# Patient Record
Sex: FEMALE | Race: WHITE | Employment: FULL TIME | URBAN - METROPOLITAN AREA
[De-identification: names, ages, dates, MRNs, and addresses within clinical notes are randomized per-mention and may not be internally consistent; named-entity substitution may affect disease eponyms.]

---

## 2017-01-11 ENCOUNTER — NURSE ONLY (OUTPATIENT)
Dept: FAMILY MEDICINE CLINIC | Facility: CLINIC | Age: 47
End: 2017-01-11

## 2017-01-11 DIAGNOSIS — Z11.1 SCREENING FOR TUBERCULOSIS: Primary | ICD-10-CM

## 2017-01-11 PROCEDURE — 86580 TB INTRADERMAL TEST: CPT | Performed by: FAMILY MEDICINE

## 2017-01-18 ENCOUNTER — NURSE ONLY (OUTPATIENT)
Dept: FAMILY MEDICINE CLINIC | Facility: CLINIC | Age: 47
End: 2017-01-18

## 2017-01-18 DIAGNOSIS — Z23 ENCOUNTER FOR IMMUNIZATION: Primary | ICD-10-CM

## 2017-01-18 LAB — INDURATION (): 0 MM (ref 0–11)

## 2017-01-18 PROCEDURE — 86580 TB INTRADERMAL TEST: CPT | Performed by: FAMILY MEDICINE

## 2017-01-20 ENCOUNTER — NURSE ONLY (OUTPATIENT)
Dept: FAMILY MEDICINE CLINIC | Facility: CLINIC | Age: 47
End: 2017-01-20

## 2017-01-20 LAB — INDURATION (): 0 MM (ref 0–11)

## 2017-05-12 ENCOUNTER — OFFICE VISIT (OUTPATIENT)
Dept: FAMILY MEDICINE CLINIC | Facility: CLINIC | Age: 47
End: 2017-05-12

## 2017-05-12 VITALS
DIASTOLIC BLOOD PRESSURE: 72 MMHG | HEART RATE: 80 BPM | BODY MASS INDEX: 41.93 KG/M2 | HEIGHT: 66.5 IN | WEIGHT: 264 LBS | SYSTOLIC BLOOD PRESSURE: 110 MMHG

## 2017-05-12 DIAGNOSIS — Z12.39 BREAST CANCER SCREENING: ICD-10-CM

## 2017-05-12 DIAGNOSIS — G25.81 RLS (RESTLESS LEGS SYNDROME): ICD-10-CM

## 2017-05-12 DIAGNOSIS — M79.7 FIBROMYALGIA: Primary | ICD-10-CM

## 2017-05-12 DIAGNOSIS — Z00.00 LABORATORY TESTS ORDERED AS PART OF A COMPLETE PHYSICAL EXAM (CPE): ICD-10-CM

## 2017-05-12 DIAGNOSIS — E66.01 MORBID OBESITY WITH BMI OF 45.0-49.9, ADULT (HCC): ICD-10-CM

## 2017-05-12 DIAGNOSIS — R73.02 GLUCOSE INTOLERANCE (IMPAIRED GLUCOSE TOLERANCE): ICD-10-CM

## 2017-05-12 DIAGNOSIS — F32.5 DEPRESSION, MAJOR, IN REMISSION (HCC): ICD-10-CM

## 2017-05-12 DIAGNOSIS — Z80.3 FH: BREAST CANCER: ICD-10-CM

## 2017-05-12 DIAGNOSIS — Z86.39 HISTORY OF VITAMIN B DEFICIENCY: ICD-10-CM

## 2017-05-12 DIAGNOSIS — Z86.39 HISTORY OF VITAMIN D DEFICIENCY: ICD-10-CM

## 2017-05-12 DIAGNOSIS — Z79.899 MEDICATION MANAGEMENT: ICD-10-CM

## 2017-05-12 PROCEDURE — 99214 OFFICE O/P EST MOD 30 MIN: CPT | Performed by: FAMILY MEDICINE

## 2017-05-12 RX ORDER — BUPROPION HYDROCHLORIDE 300 MG/1
300 TABLET ORAL DAILY
Qty: 90 TABLET | Refills: 1 | Status: SHIPPED | OUTPATIENT
Start: 2017-05-12 | End: 2017-09-08

## 2017-05-12 RX ORDER — GABAPENTIN 100 MG/1
CAPSULE ORAL NIGHTLY
Qty: 60 CAPSULE | Refills: 5 | Status: SHIPPED | OUTPATIENT
Start: 2017-05-12 | End: 2017-08-05

## 2017-05-12 RX ORDER — SENNOSIDES 8.6 MG
1300 CAPSULE ORAL 2 TIMES DAILY
COMMUNITY
End: 2018-11-13 | Stop reason: ALTCHOICE

## 2017-05-12 NOTE — PROGRESS NOTES
Carol Webb is a 40year old female. HPI:   Patient presents with:  Fibromyalgia: Rm. 9: Mirapex & Wellbutrin    #1 Fibromyalgia   was rough due to change in job more stress at job. Average is 6 on pain scale. Getting promotion.     Sleep is daily as needed. Disp: 1 Inhaler Rfl: 11   ValACYclovir HCl 500 MG Oral Tab Take 1 tablet by mouth daily. Disp:  Rfl: 1   Naproxen Sodium (ALEVE OR) Take 3 tablets by mouth 2 (two) times daily.    Disp:  Rfl:       Past Medical History   Diagnosis Date bowel sounds ×4 quadrant, no hepatosplenomegaly or masses, and no tenderness   EXTREMITIES: no cyanosis, clubbing or edema  Musculoskeletal: No gross deficit does have trigger point tenderness to the upper chest and back  Neurological: nerves II through XI Hr; Take 1 tablet (300 mg total) by mouth daily. Dispense: 90 tablet; Refill: 1    4. Breast cancer screening   FH: breast cancer  - mmm RISHI SAVAGE; Future    6.  Morbid obesity with BMI of 45.0-49.9, adult (HCC)  - BuPROPion HCl ER, XL, 300 MG Oral Ta

## 2017-05-14 PROBLEM — F32.5 DEPRESSION, MAJOR, IN REMISSION (HCC): Status: ACTIVE | Noted: 2017-05-14

## 2017-08-05 DIAGNOSIS — G25.81 RLS (RESTLESS LEGS SYNDROME): ICD-10-CM

## 2017-08-05 DIAGNOSIS — M79.7 FIBROMYALGIA: ICD-10-CM

## 2017-08-05 RX ORDER — GABAPENTIN 100 MG/1
CAPSULE ORAL
Qty: 270 CAPSULE | Refills: 0 | Status: SHIPPED | OUTPATIENT
Start: 2017-08-05 | End: 2017-09-08

## 2017-08-07 ENCOUNTER — MED REC SCAN ONLY (OUTPATIENT)
Dept: FAMILY MEDICINE CLINIC | Facility: CLINIC | Age: 47
End: 2017-08-07

## 2017-08-07 PROBLEM — H40.9 GLAUCOMA: Status: ACTIVE | Noted: 2017-08-07

## 2017-09-08 ENCOUNTER — LAB ENCOUNTER (OUTPATIENT)
Dept: LAB | Age: 47
End: 2017-09-08
Attending: FAMILY MEDICINE
Payer: COMMERCIAL

## 2017-09-08 ENCOUNTER — OFFICE VISIT (OUTPATIENT)
Dept: FAMILY MEDICINE CLINIC | Facility: CLINIC | Age: 47
End: 2017-09-08

## 2017-09-08 VITALS
RESPIRATION RATE: 16 BRPM | WEIGHT: 263 LBS | HEART RATE: 75 BPM | DIASTOLIC BLOOD PRESSURE: 78 MMHG | BODY MASS INDEX: 41.77 KG/M2 | HEIGHT: 66.5 IN | SYSTOLIC BLOOD PRESSURE: 134 MMHG

## 2017-09-08 DIAGNOSIS — Z86.39 HISTORY OF VITAMIN D DEFICIENCY: ICD-10-CM

## 2017-09-08 DIAGNOSIS — Z00.00 LABORATORY TESTS ORDERED AS PART OF A COMPLETE PHYSICAL EXAM (CPE): ICD-10-CM

## 2017-09-08 DIAGNOSIS — Z86.39 HISTORY OF VITAMIN B DEFICIENCY: ICD-10-CM

## 2017-09-08 DIAGNOSIS — G25.81 RLS (RESTLESS LEGS SYNDROME): ICD-10-CM

## 2017-09-08 DIAGNOSIS — Z79.899 MEDICATION MANAGEMENT: ICD-10-CM

## 2017-09-08 DIAGNOSIS — M25.551 RIGHT HIP PAIN: ICD-10-CM

## 2017-09-08 DIAGNOSIS — E66.01 MORBID OBESITY WITH BMI OF 45.0-49.9, ADULT (HCC): ICD-10-CM

## 2017-09-08 DIAGNOSIS — M79.7 FIBROMYALGIA: ICD-10-CM

## 2017-09-08 DIAGNOSIS — F32.5 DEPRESSION, MAJOR, IN REMISSION (HCC): ICD-10-CM

## 2017-09-08 DIAGNOSIS — R73.02 GLUCOSE INTOLERANCE (IMPAIRED GLUCOSE TOLERANCE): ICD-10-CM

## 2017-09-08 DIAGNOSIS — Z00.00 WELL FEMALE EXAM WITHOUT GYNECOLOGICAL EXAM: Primary | ICD-10-CM

## 2017-09-08 LAB
25-HYDROXYVITAMIN D (TOTAL): 49.1 NG/ML (ref 30–100)
ALBUMIN SERPL-MCNC: 4.4 G/DL (ref 3.5–4.8)
ALP LIVER SERPL-CCNC: 65 U/L (ref 37–98)
ALT SERPL-CCNC: 29 U/L (ref 14–54)
AST SERPL-CCNC: 12 U/L (ref 15–41)
BASOPHILS # BLD AUTO: 0.04 X10(3) UL (ref 0–0.1)
BASOPHILS NFR BLD AUTO: 0.8 %
BILIRUB SERPL-MCNC: 0.8 MG/DL (ref 0.1–2)
BUN BLD-MCNC: 22 MG/DL (ref 8–20)
CALCIUM BLD-MCNC: 9.4 MG/DL (ref 8.3–10.3)
CHLORIDE: 105 MMOL/L (ref 101–111)
CHOLEST SMN-MCNC: 172 MG/DL (ref ?–200)
CO2: 30 MMOL/L (ref 22–32)
CREAT BLD-MCNC: 1.14 MG/DL (ref 0.55–1.02)
EOSINOPHIL # BLD AUTO: 0.1 X10(3) UL (ref 0–0.3)
EOSINOPHIL NFR BLD AUTO: 1.9 %
ERYTHROCYTE [DISTWIDTH] IN BLOOD BY AUTOMATED COUNT: 12.4 % (ref 11.5–16)
EST. AVERAGE GLUCOSE BLD GHB EST-MCNC: 114 MG/DL (ref 68–126)
FREE T4: 1.2 NG/DL (ref 0.9–1.8)
GLUCOSE BLD-MCNC: 99 MG/DL (ref 70–99)
HAV AB SERPL IA-ACNC: 360 PG/ML (ref 193–986)
HBA1C MFR BLD HPLC: 5.6 % (ref ?–5.7)
HCT VFR BLD AUTO: 41.7 % (ref 34–50)
HDLC SERPL-MCNC: 45 MG/DL (ref 45–?)
HDLC SERPL: 3.82 {RATIO} (ref ?–4.44)
HGB BLD-MCNC: 13.3 G/DL (ref 12–16)
IMMATURE GRANULOCYTE COUNT: 0.02 X10(3) UL (ref 0–1)
IMMATURE GRANULOCYTE RATIO %: 0.4 %
LDLC SERPL CALC-MCNC: 94 MG/DL (ref ?–130)
LDLC SERPL-MCNC: 33 MG/DL (ref 5–40)
LYMPHOCYTES # BLD AUTO: 1.63 X10(3) UL (ref 0.9–4)
LYMPHOCYTES NFR BLD AUTO: 31.3 %
M PROTEIN MFR SERPL ELPH: 7.7 G/DL (ref 6.1–8.3)
MCH RBC QN AUTO: 30.2 PG (ref 27–33.2)
MCHC RBC AUTO-ENTMCNC: 31.9 G/DL (ref 31–37)
MCV RBC AUTO: 94.8 FL (ref 81–100)
MONOCYTES # BLD AUTO: 0.26 X10(3) UL (ref 0.1–0.6)
MONOCYTES NFR BLD AUTO: 5 %
NEUTROPHIL ABS PRELIM: 3.16 X10 (3) UL (ref 1.3–6.7)
NEUTROPHILS # BLD AUTO: 3.16 X10(3) UL (ref 1.3–6.7)
NEUTROPHILS NFR BLD AUTO: 60.6 %
NONHDLC SERPL-MCNC: 127 MG/DL (ref ?–130)
PLATELET # BLD AUTO: 225 10(3)UL (ref 150–450)
POTASSIUM SERPL-SCNC: 4.3 MMOL/L (ref 3.6–5.1)
RBC # BLD AUTO: 4.4 X10(6)UL (ref 3.8–5.1)
RED CELL DISTRIBUTION WIDTH-SD: 43.1 FL (ref 35.1–46.3)
SODIUM SERPL-SCNC: 141 MMOL/L (ref 136–144)
TRIGLYCERIDES: 163 MG/DL (ref ?–150)
TSI SER-ACNC: 1.14 MIU/ML (ref 0.35–5.5)
WBC # BLD AUTO: 5.2 X10(3) UL (ref 4–13)

## 2017-09-08 PROCEDURE — 82607 VITAMIN B-12: CPT | Performed by: FAMILY MEDICINE

## 2017-09-08 PROCEDURE — 83036 HEMOGLOBIN GLYCOSYLATED A1C: CPT | Performed by: FAMILY MEDICINE

## 2017-09-08 PROCEDURE — 82306 VITAMIN D 25 HYDROXY: CPT | Performed by: FAMILY MEDICINE

## 2017-09-08 PROCEDURE — 36415 COLL VENOUS BLD VENIPUNCTURE: CPT | Performed by: FAMILY MEDICINE

## 2017-09-08 PROCEDURE — 80061 LIPID PANEL: CPT | Performed by: FAMILY MEDICINE

## 2017-09-08 PROCEDURE — 99213 OFFICE O/P EST LOW 20 MIN: CPT | Performed by: FAMILY MEDICINE

## 2017-09-08 PROCEDURE — 80050 GENERAL HEALTH PANEL: CPT | Performed by: FAMILY MEDICINE

## 2017-09-08 PROCEDURE — 99396 PREV VISIT EST AGE 40-64: CPT | Performed by: FAMILY MEDICINE

## 2017-09-08 PROCEDURE — 84439 ASSAY OF FREE THYROXINE: CPT | Performed by: FAMILY MEDICINE

## 2017-09-08 RX ORDER — ALBUTEROL SULFATE 90 UG/1
2 AEROSOL, METERED RESPIRATORY (INHALATION) EVERY 6 HOURS PRN
Qty: 1 INHALER | Refills: 0 | Status: SHIPPED | OUTPATIENT
Start: 2017-09-08 | End: 2018-05-15

## 2017-09-08 RX ORDER — BUPROPION HYDROCHLORIDE 300 MG/1
300 TABLET ORAL DAILY
Qty: 90 TABLET | Refills: 1 | Status: SHIPPED | OUTPATIENT
Start: 2017-09-08 | End: 2018-04-11

## 2017-09-08 RX ORDER — GABAPENTIN 100 MG/1
CAPSULE ORAL
Qty: 270 CAPSULE | Refills: 1 | Status: SHIPPED | OUTPATIENT
Start: 2017-09-08 | End: 2018-05-15

## 2017-09-08 NOTE — PROGRESS NOTES
HPI:   Milka Rizvi is a 40year old female who presents for a complete physical exam. Symptoms: denies discharge, itching, burning or dysuria, every 3 months.      Abnormal pap last abnormal 11 years ago biopsy HPV, cryo procedure she thinks, last daily. Disp:  Rfl:    Cholecalciferol (VITAMIN D3) 90053 UNITS Oral Cap 1 capsule 3 times weekly  Disp:  Rfl:    Fluticasone Propionate 50 MCG/ACT Nasal Suspension 2 sprays by Nasal route daily as needed.  Disp: 1 Inhaler Rfl: 11   ValACYclovir HCl 500 MG O closely     REVIEW OF SYSTEMS:   GENERAL: denies fevers, weakness, trouble sleeping or weight changes  SKIN: denies any unusual skin lesions or rashes  EYES:denies vision changes  HEENT: denies upper respiratory symptoms  LUNGS: denies cough or shortness o physical exam.    Well female exam without gynecological exam  (primary encounter diagnosis)  Right hip pain  Fibromyalgia  Depression, major, in remission (hcc)  Rls (restless legs syndrome)  Morbid obesity with bmi of 45.0-49.9, adult ([facility])  Medication m exercise daily for 30-40 minutes. Patient is to have small frequent meals which would include protein and low carbohydrates. Increase dietary fruits, vegetables, foods high in fiber and nuts. Reviewed weight loss plan. Advised adequate sleep.  Patient is t

## 2017-09-09 ENCOUNTER — PATIENT MESSAGE (OUTPATIENT)
Dept: FAMILY MEDICINE CLINIC | Facility: CLINIC | Age: 47
End: 2017-09-09

## 2017-09-09 NOTE — PROGRESS NOTES
Hemoglobin A1c is much better 5.6  Try to reduce nonsteroidals are avoided altogether the kidney function has gone down since last check 1 year ago.   Try acetaminophen versus nonsteroidal.  Liidocaine patches or other alternative pain relievers besides non

## 2017-09-11 ENCOUNTER — TELEPHONE (OUTPATIENT)
Dept: FAMILY MEDICINE CLINIC | Facility: CLINIC | Age: 47
End: 2017-09-11

## 2017-09-11 DIAGNOSIS — Z79.899 MEDICATION MANAGEMENT: Primary | ICD-10-CM

## 2017-09-11 NOTE — TELEPHONE ENCOUNTER
----- Message from Jamel Reyes PA-C sent at 9/8/2017  8:54 PM CDT -----  Hemoglobin A1c is much better 5.6  Try to reduce nonsteroidals are avoided altogether the kidney function has gone down since last check 1 year ago.   Try acetaminophen versus no

## 2017-09-12 NOTE — TELEPHONE ENCOUNTER
----- Message from Jacqueline Salinas PA-C sent at 9/11/2017  4:19 PM CDT -----  Please forward her labs to her eye doctor see my chart message.   thanks

## 2017-09-13 ENCOUNTER — TELEPHONE (OUTPATIENT)
Dept: FAMILY MEDICINE CLINIC | Facility: CLINIC | Age: 47
End: 2017-09-13

## 2017-09-13 NOTE — TELEPHONE ENCOUNTER
Discussed test results with her ophthalmologist Dr. Pierre Higgins.  Ophthalmologist was told that she is going to stop nonsteroidals and repeat kidney function in 1 month.   Most likely kidney function decrease is secondary to her using more nonsteroidals recently

## 2017-10-05 DIAGNOSIS — F32.5 DEPRESSION, MAJOR, IN REMISSION (HCC): ICD-10-CM

## 2017-10-05 DIAGNOSIS — E66.01 MORBID OBESITY WITH BMI OF 45.0-49.9, ADULT (HCC): ICD-10-CM

## 2017-10-05 DIAGNOSIS — M79.7 FIBROMYALGIA: ICD-10-CM

## 2017-10-05 RX ORDER — BUPROPION HYDROCHLORIDE 300 MG/1
TABLET ORAL
Qty: 90 TABLET | Refills: 0 | OUTPATIENT
Start: 2017-10-05

## 2017-10-13 ENCOUNTER — HOSPITAL ENCOUNTER (OUTPATIENT)
Dept: MAMMOGRAPHY | Facility: HOSPITAL | Age: 47
Discharge: HOME | End: 2017-10-13
Attending: FAMILY MEDICINE
Payer: COMMERCIAL

## 2017-10-13 ENCOUNTER — HOSPITAL ENCOUNTER (OUTPATIENT)
Dept: GENERAL RADIOLOGY | Facility: HOSPITAL | Age: 47
Discharge: HOME | End: 2017-10-13
Attending: FAMILY MEDICINE
Payer: COMMERCIAL

## 2017-10-13 ENCOUNTER — LAB ENCOUNTER (OUTPATIENT)
Dept: LAB | Facility: HOSPITAL | Age: 47
End: 2017-10-13
Attending: FAMILY MEDICINE
Payer: COMMERCIAL

## 2017-10-13 DIAGNOSIS — Z12.39 BREAST CANCER SCREENING: ICD-10-CM

## 2017-10-13 DIAGNOSIS — M25.551 RIGHT HIP PAIN: ICD-10-CM

## 2017-10-13 DIAGNOSIS — Z80.3 FH: BREAST CANCER: ICD-10-CM

## 2017-10-13 DIAGNOSIS — Z79.899 MEDICATION MANAGEMENT: ICD-10-CM

## 2017-10-13 PROCEDURE — 77067 SCR MAMMO BI INCL CAD: CPT | Performed by: FAMILY MEDICINE

## 2017-10-13 PROCEDURE — 36415 COLL VENOUS BLD VENIPUNCTURE: CPT

## 2017-10-13 PROCEDURE — 73502 X-RAY EXAM HIP UNI 2-3 VIEWS: CPT | Performed by: FAMILY MEDICINE

## 2017-10-13 PROCEDURE — 80048 BASIC METABOLIC PNL TOTAL CA: CPT

## 2017-10-13 NOTE — PROGRESS NOTES
Severe right hip osteoarthritis/narrowing of the right hip joint refer to Dr. Va Lorenzo orthopedic for evaluation. Call patient with Dr. Ace Joseph information sent to my chart.

## 2017-10-13 NOTE — PROGRESS NOTES
GFR is down some more refer patient to nephrologist.  Call patient and notify her of nephrologist either Dr. Aletha Young or Dr. Sherren Bellis   sent to my chart

## 2017-10-17 ENCOUNTER — TELEPHONE (OUTPATIENT)
Dept: FAMILY MEDICINE CLINIC | Facility: CLINIC | Age: 47
End: 2017-10-17

## 2017-10-17 DIAGNOSIS — M16.11 OSTEOARTHRITIS OF RIGHT HIP, UNSPECIFIED OSTEOARTHRITIS TYPE: Primary | ICD-10-CM

## 2017-10-17 DIAGNOSIS — R94.4 DECREASED GFR: ICD-10-CM

## 2017-10-17 NOTE — TELEPHONE ENCOUNTER
Fabiana Mtz PA-C  [facility] Staff             GFR is down some more refer patient to nephrologist.   Call patient and notify her of nephrologist either Dr. Ines Mondragon or Dr. Mario Alberto Horan   sent to my chart

## 2017-10-17 NOTE — TELEPHONE ENCOUNTER
----- Message from Erin David PA-C sent at 10/13/2017  4:41 PM CDT -----  Severe right hip osteoarthritis/narrowing of the right hip joint refer to Dr. Joaquim Ochoa orthopedic for evaluation. Call patient with Dr. Edilberto Botello information sent to my chart.

## 2017-10-17 NOTE — TELEPHONE ENCOUNTER
Pt informed of test results via my chart. Lm on private voicemail, per signed consent, with Dr. Neris Young contact information.

## 2017-12-14 PROBLEM — M16.11 PRIMARY OSTEOARTHRITIS OF RIGHT HIP: Status: ACTIVE | Noted: 2017-12-14

## 2018-02-06 ENCOUNTER — OFFICE VISIT (OUTPATIENT)
Dept: FAMILY MEDICINE CLINIC | Facility: CLINIC | Age: 48
End: 2018-02-06

## 2018-02-06 VITALS
HEART RATE: 88 BPM | BODY MASS INDEX: 40.82 KG/M2 | HEIGHT: 66.22 IN | WEIGHT: 254 LBS | DIASTOLIC BLOOD PRESSURE: 78 MMHG | SYSTOLIC BLOOD PRESSURE: 118 MMHG | TEMPERATURE: 98 F

## 2018-02-06 DIAGNOSIS — M79.89 MASS OF SOFT TISSUE OF RIGHT UPPER EXTREMITY: Primary | ICD-10-CM

## 2018-02-06 PROCEDURE — 99213 OFFICE O/P EST LOW 20 MIN: CPT | Performed by: FAMILY MEDICINE

## 2018-02-06 NOTE — PROGRESS NOTES
Pierce Epley is a 56year old female. HPI:   Patient has been walking with a limp secondary to needing hip surgery. Is getting right hip replacement with [facility] Dr. Zak White on 6/13/18.   Patient noticed a lump on the right side of the without intrauterine pregnancy 1997   • Uveitis     left   • Vitamin D deficiency    • Wheezing 10/14/10      Social History:  Smoking status: Former Smoker                                                              Packs/day: 0.25      Years: 0.00 (primary encounter diagnosis)    No orders of the defined types were placed in this encounter. Meds & Refills for this Visit:  No prescriptions requested or ordered in this encounter    Imaging & Consults:  SURGERY - INTERNAL  1.  Mass of soft tissue

## 2018-02-06 NOTE — PATIENT INSTRUCTIONS
SCHEDULING [facility] LAB APPOINTMENTS ONLINE    Lab appointments can now be scheduled online at [email or web address]    · Go to [email or web address]  · In Search type Lab  · Click \"Lab services\"  · Click \"Schedule Your Test Online\"  · Follow the prompts  · If you But if the lipoma is painful or you want it removed for cosmetic reasons, it can be removed with surgery. The surgery is called excision. The lipoma will most likely not grow back after surgery. During surgery, the area around the lipoma is numbed.  If you

## 2018-04-11 DIAGNOSIS — E66.01 MORBID OBESITY WITH BMI OF 45.0-49.9, ADULT (HCC): ICD-10-CM

## 2018-04-11 DIAGNOSIS — M79.7 FIBROMYALGIA: ICD-10-CM

## 2018-04-11 DIAGNOSIS — F32.5 DEPRESSION, MAJOR, IN REMISSION (HCC): ICD-10-CM

## 2018-04-11 RX ORDER — BUPROPION HYDROCHLORIDE 300 MG/1
TABLET ORAL
Qty: 90 TABLET | Refills: 0 | Status: SHIPPED | OUTPATIENT
Start: 2018-04-11 | End: 2018-05-15

## 2018-05-15 ENCOUNTER — OFFICE VISIT (OUTPATIENT)
Dept: FAMILY MEDICINE CLINIC | Facility: CLINIC | Age: 48
End: 2018-05-15

## 2018-05-15 ENCOUNTER — LAB ENCOUNTER (OUTPATIENT)
Dept: LAB | Age: 48
End: 2018-05-15
Attending: FAMILY MEDICINE
Payer: COMMERCIAL

## 2018-05-15 VITALS
HEIGHT: 66.22 IN | HEART RATE: 84 BPM | RESPIRATION RATE: 16 BRPM | BODY MASS INDEX: 40.63 KG/M2 | WEIGHT: 252.81 LBS | SYSTOLIC BLOOD PRESSURE: 128 MMHG | DIASTOLIC BLOOD PRESSURE: 72 MMHG

## 2018-05-15 DIAGNOSIS — L73.9 FOLLICULITIS: ICD-10-CM

## 2018-05-15 DIAGNOSIS — Z01.818 PREOP EXAMINATION: Primary | ICD-10-CM

## 2018-05-15 DIAGNOSIS — J45.40 MODERATE PERSISTENT EXTRINSIC ASTHMA WITHOUT COMPLICATION: ICD-10-CM

## 2018-05-15 DIAGNOSIS — F32.5 DEPRESSION, MAJOR, IN REMISSION (HCC): ICD-10-CM

## 2018-05-15 DIAGNOSIS — M79.7 FIBROMYALGIA: ICD-10-CM

## 2018-05-15 DIAGNOSIS — M16.11 PRIMARY OSTEOARTHRITIS OF RIGHT HIP: ICD-10-CM

## 2018-05-15 DIAGNOSIS — N18.30 CHRONIC RENAL INSUFFICIENCY, STAGE 3 (MODERATE) (HCC): ICD-10-CM

## 2018-05-15 DIAGNOSIS — G25.81 RLS (RESTLESS LEGS SYNDROME): ICD-10-CM

## 2018-05-15 DIAGNOSIS — E66.01 MORBID OBESITY WITH BMI OF 45.0-49.9, ADULT (HCC): ICD-10-CM

## 2018-05-15 DIAGNOSIS — Z01.818 PREOP EXAMINATION: ICD-10-CM

## 2018-05-15 PROCEDURE — 99214 OFFICE O/P EST MOD 30 MIN: CPT | Performed by: FAMILY MEDICINE

## 2018-05-15 PROCEDURE — 87147 CULTURE TYPE IMMUNOLOGIC: CPT | Performed by: FAMILY MEDICINE

## 2018-05-15 PROCEDURE — 87070 CULTURE OTHR SPECIMN AEROBIC: CPT | Performed by: FAMILY MEDICINE

## 2018-05-15 PROCEDURE — 85025 COMPLETE CBC W/AUTO DIFF WBC: CPT | Performed by: FAMILY MEDICINE

## 2018-05-15 PROCEDURE — 36415 COLL VENOUS BLD VENIPUNCTURE: CPT | Performed by: FAMILY MEDICINE

## 2018-05-15 PROCEDURE — 87205 SMEAR GRAM STAIN: CPT | Performed by: FAMILY MEDICINE

## 2018-05-15 PROCEDURE — 80053 COMPREHEN METABOLIC PANEL: CPT | Performed by: FAMILY MEDICINE

## 2018-05-15 PROCEDURE — 93000 ELECTROCARDIOGRAM COMPLETE: CPT | Performed by: FAMILY MEDICINE

## 2018-05-15 PROCEDURE — 87186 SC STD MICRODIL/AGAR DIL: CPT | Performed by: FAMILY MEDICINE

## 2018-05-15 PROCEDURE — 94010 BREATHING CAPACITY TEST: CPT | Performed by: FAMILY MEDICINE

## 2018-05-15 RX ORDER — BUDESONIDE AND FORMOTEROL FUMARATE DIHYDRATE 160; 4.5 UG/1; UG/1
2 AEROSOL RESPIRATORY (INHALATION) 2 TIMES DAILY
Qty: 1 INHALER | Refills: 5 | Status: SHIPPED | OUTPATIENT
Start: 2018-05-15

## 2018-05-15 RX ORDER — BUPROPION HYDROCHLORIDE 300 MG/1
TABLET ORAL
Qty: 90 TABLET | Refills: 1 | Status: SHIPPED | OUTPATIENT
Start: 2018-05-15 | End: 2018-10-10

## 2018-05-15 RX ORDER — ALBUTEROL SULFATE 90 UG/1
2 AEROSOL, METERED RESPIRATORY (INHALATION) EVERY 6 HOURS PRN
Qty: 1 INHALER | Refills: 0 | Status: SHIPPED | OUTPATIENT
Start: 2018-05-15 | End: 2018-11-13

## 2018-05-15 RX ORDER — GABAPENTIN 300 MG/1
300 CAPSULE ORAL 3 TIMES DAILY
Qty: 90 CAPSULE | Refills: 1 | Status: SHIPPED | OUTPATIENT
Start: 2018-05-15 | End: 2018-11-13

## 2018-05-16 ENCOUNTER — TELEPHONE (OUTPATIENT)
Dept: FAMILY MEDICINE CLINIC | Facility: CLINIC | Age: 48
End: 2018-05-16

## 2018-05-16 PROBLEM — J45.909 EXTRINSIC ASTHMA: Status: ACTIVE | Noted: 2018-05-16

## 2018-05-16 NOTE — TELEPHONE ENCOUNTER
----- Message from Kip Duran PA-C sent at 5/15/2018  9:04 PM CDT -----  CMP and CBC normal  Sent to my chart  Forward labs for surgery

## 2018-05-17 NOTE — TELEPHONE ENCOUNTER
----- Message from Ellen Castellanos PA-C sent at 5/17/2018  7:25 AM CDT -----  There is growth of staph aureus I am still waiting on the sensitivity will need to put patient on antibiotic before her  surgery.   Please let patient know we are still awaiting

## 2018-05-17 NOTE — TELEPHONE ENCOUNTER
A message was left on the patient's confidential voicemail notifying her that there was growth of staph aureus, but that Aleyda Simpson is still waiting on the sensitivity report that she will need in order to put her on an antibiotic before surgery.

## 2018-05-17 NOTE — PROGRESS NOTES
There is growth of staph aureus I am still waiting on the sensitivity will need to put patient on antibiotic before her  surgery. Please let patient know we are still awaiting sensitivity report.

## 2018-05-17 NOTE — PROGRESS NOTES
Growth staph aureus not methicillin-resistant start Bactrim DS (since you are allergic to penicillin) twice a day for 10 days.

## 2018-05-18 RX ORDER — SULFAMETHOXAZOLE AND TRIMETHOPRIM 800; 160 MG/1; MG/1
1 TABLET ORAL 2 TIMES DAILY
Qty: 20 TABLET | Refills: 0 | Status: SHIPPED | OUTPATIENT
Start: 2018-05-18 | End: 2018-05-28

## 2018-05-18 NOTE — TELEPHONE ENCOUNTER
A message was left on the patient's confidential voicemail notifying her that the growth staph aureus is not methicillin-resistant. She was directed to start Bactrim DS twice a day for 10 days. The patient was instructed to call back with any questions.

## 2018-05-18 NOTE — TELEPHONE ENCOUNTER
----- Message from Vannesa Mercedes PA-C sent at 5/17/2018  4:53 PM CDT -----  Growth staph aureus not methicillin-resistant start Bactrim DS (since you are allergic to penicillin) twice a day for 10 days.

## 2018-06-01 ENCOUNTER — TELEPHONE (OUTPATIENT)
Dept: FAMILY MEDICINE CLINIC | Facility: CLINIC | Age: 48
End: 2018-06-01

## 2018-06-01 NOTE — TELEPHONE ENCOUNTER
The patient's Pre-op H&P, EKG, Spirometry, & labs were successfully faxed again to Dr. Charlotte Nam at [phone] since his consult note from 05/31/2018 stated that they were still awaiting medical clearance.

## 2018-06-01 NOTE — TELEPHONE ENCOUNTER
On 05/18/2018, the patient's Pre-op information was successfully faxed to Dr. Ivana Lainez at [phone].

## 2018-06-19 ENCOUNTER — PATIENT OUTREACH (OUTPATIENT)
Dept: CASE MANAGEMENT | Age: 48
End: 2018-06-19

## 2018-07-03 NOTE — PROGRESS NOTES
Multiple attempts to reach pt and messages left with no call back. Past TCM timeframe. Encounter closing.

## 2018-10-10 DIAGNOSIS — M79.7 FIBROMYALGIA: ICD-10-CM

## 2018-10-10 DIAGNOSIS — E66.01 MORBID OBESITY WITH BMI OF 45.0-49.9, ADULT (HCC): ICD-10-CM

## 2018-10-10 DIAGNOSIS — F32.5 DEPRESSION, MAJOR, IN REMISSION (HCC): ICD-10-CM

## 2018-10-10 RX ORDER — BUPROPION HYDROCHLORIDE 300 MG/1
TABLET ORAL
Qty: 90 TABLET | Refills: 0 | Status: SHIPPED | OUTPATIENT
Start: 2018-10-10

## 2018-11-13 ENCOUNTER — OFFICE VISIT (OUTPATIENT)
Dept: FAMILY MEDICINE CLINIC | Facility: CLINIC | Age: 48
End: 2018-11-13
Payer: COMMERCIAL

## 2018-11-13 VITALS
SYSTOLIC BLOOD PRESSURE: 104 MMHG | BODY MASS INDEX: 39.86 KG/M2 | WEIGHT: 248 LBS | DIASTOLIC BLOOD PRESSURE: 68 MMHG | HEART RATE: 72 BPM | HEIGHT: 66.3 IN

## 2018-11-13 DIAGNOSIS — Z12.4 SCREENING FOR MALIGNANT NEOPLASM OF CERVIX: ICD-10-CM

## 2018-11-13 DIAGNOSIS — Z01.419 WELL FEMALE EXAM WITH ROUTINE GYNECOLOGICAL EXAM: Primary | ICD-10-CM

## 2018-11-13 DIAGNOSIS — Z79.899 MEDICATION MANAGEMENT: ICD-10-CM

## 2018-11-13 DIAGNOSIS — J45.20 MILD INTERMITTENT EXTRINSIC ASTHMA WITHOUT COMPLICATION: ICD-10-CM

## 2018-11-13 DIAGNOSIS — Z12.39 BREAST CANCER SCREENING: ICD-10-CM

## 2018-11-13 DIAGNOSIS — Z00.00 LABORATORY EXAMINATION ORDERED AS PART OF A ROUTINE GENERAL MEDICAL EXAMINATION: ICD-10-CM

## 2018-11-13 DIAGNOSIS — G25.81 RLS (RESTLESS LEGS SYNDROME): ICD-10-CM

## 2018-11-13 PROCEDURE — 88175 CYTOPATH C/V AUTO FLUID REDO: CPT | Performed by: FAMILY MEDICINE

## 2018-11-13 PROCEDURE — 99396 PREV VISIT EST AGE 40-64: CPT | Performed by: FAMILY MEDICINE

## 2018-11-13 PROCEDURE — 87624 HPV HI-RISK TYP POOLED RSLT: CPT | Performed by: FAMILY MEDICINE

## 2018-11-13 PROCEDURE — 99213 OFFICE O/P EST LOW 20 MIN: CPT | Performed by: FAMILY MEDICINE

## 2018-11-13 RX ORDER — ACETAMINOPHEN 500 MG
TABLET ORAL DAILY PRN
COMMUNITY

## 2018-11-13 RX ORDER — GABAPENTIN 300 MG/1
300 CAPSULE ORAL NIGHTLY
Qty: 90 CAPSULE | Refills: 1 | Status: SHIPPED | OUTPATIENT
Start: 2018-11-13 | End: 2019-03-19

## 2018-11-13 RX ORDER — ALBUTEROL SULFATE 90 UG/1
2 AEROSOL, METERED RESPIRATORY (INHALATION) EVERY 6 HOURS PRN
Qty: 1 INHALER | Refills: 0 | Status: SHIPPED | OUTPATIENT
Start: 2018-11-13

## 2018-11-13 NOTE — PROGRESS NOTES
HPI:   Tony Regalado is a 53year old female who presents for a complete physical exam.   Symptoms: perimenopausal occassional hot flashes.    Symptoms: denies discharge, itching, burning or dysuria, 2 periods in the past 2 months prior was every 6 24 Hr TAKE 1 TABLET(300 MG) BY MOUTH DAILY Disp: 90 tablet Rfl: 0   Cholecalciferol (VITAMIN D3) 59141 UNITS Oral Cap 1 capsule 3 times weekly  Disp:  Rfl:    Fluticasone Propionate 50 MCG/ACT Nasal Suspension 2 sprays by Nasal route daily as needed.  Disp: = 27 pack years    Alcohol use: Yes      Alcohol/week: 0.0 - 1.2 oz      Drinks per session: 1 or 2      Binge frequency: Never      Comment: social    Drug use: No    Occ: . : engaged. Children: . Exercise: walking.   Diet: watches becerra normal discharge and normal cervix. Bimanual exam normal no adnexal masses or cervical motion tenderness, PAP was done    MUSCULOSKELETAL: gait aramis,l no gross M/S defect.   EXTREMITIES: no clubbing, cyanosis, or edema  NEURO: oriented times three, crania asthma without complication  Albuterol inhaler as needed  4. Laboratory examination ordered as part of a routine general medical examination  - COMP METABOLIC PANEL (14); Future  - LIPID PANEL; Future  - TSH+FREE T4; Future    5.  Breast cancer screening  -

## 2018-11-14 PROBLEM — J45.909 EXTRINSIC ASTHMA: Status: RESOLVED | Noted: 2018-05-16 | Resolved: 2018-11-14

## 2018-12-14 ENCOUNTER — TELEPHONE (OUTPATIENT)
Dept: FAMILY MEDICINE CLINIC | Facility: CLINIC | Age: 48
End: 2018-12-14

## 2018-12-14 DIAGNOSIS — Z01.84 IMMUNITY STATUS TESTING: Primary | ICD-10-CM

## 2018-12-14 NOTE — TELEPHONE ENCOUNTER
Pt called stating she had a TB done at her work and came back questionable and her employer is requesting a TB lab done. Pt would like this ordered asap because she can not return to work until this is complete.  Pt was advised BODØ is not in office wou

## 2018-12-17 NOTE — TELEPHONE ENCOUNTER
Lab test already placed in system on Friday and once the results are in pt would like to be notified and also have a copy faxed to her office.   She will call our office back later with the fax number to her office

## 2018-12-21 NOTE — TELEPHONE ENCOUNTER
A message was left on the patient's confidential voicemail notifying her that her lab results were negative and that they were being faxed per her instructions. Per patient request, her results were successfully faxed to [phone], Attn: Omer Anaya.

## 2019-06-10 ENCOUNTER — TELEPHONE (OUTPATIENT)
Dept: FAMILY MEDICINE CLINIC | Facility: CLINIC | Age: 49
End: 2019-06-10

## 2019-06-10 NOTE — TELEPHONE ENCOUNTER
Received medical records request from [facility]. Request sent to [employer] via green bag.

## 2021-09-10 NOTE — H&P
Milka Rizvi is a 56year old female who presents for a pre-operative physical exam.   Right Total Hip Arthroplasty Anterior Approach 6/13/18: Right hip replacement with/Dr. Debbie Bill at [facility].      Prior surgery Ectopic pregnancy laparoscopic and c - 1.00 x10(3) uL 0.23   Eosinophils Absolute      0.00 - 0.30 x10(3) uL 0.05   Basophils Absolute      0.00 - 0.10 x10(3) uL 0.02   Immature Granulocyte Absolute      0.00 - 1.00 x10(3) uL 0.02   Neutrophils %      % 57.9   Lymphocytes %      % 35.7   Mono lungs 2 (two) times daily. Rinse mouth after use Disp: 1 Inhaler Rfl: 5   Acetaminophen ER (TYLENOL ARTHRITIS PAIN) 650 MG Oral Tab CR Take 1,300 mg by mouth 2 (two) times daily.  Disp:  Rfl:    Cholecalciferol (VITAMIN D3) 18142 UNITS Oral Cap 1 capsule 3 Currently uses an e-cigarette; 2 packs/day x            13 years + 1/2 a pack/day = 27 pack years  Alcohol use: Yes           0.0 - 1.2 oz/week     Standard drinks or equivalent: 0 - 2 per week     Comment: social     Occ: off work for 1 month.  : fi edema  NEURO: Oriented times three,cranial nerves are intact,motor and sensory are grossly intact    ASSESSMENT AND PLAN:   Ferdinand Huang is a 56year old female who presents for a pre-operative physical exam.   Preop examination  (primary encounte the referring physician, Dr. Nile Hudson at [facility]. .      - BREATHING CAPACITY TEST  - ELECTROCARDIOGRAM, COMPLETE  - COMP METABOLIC PANEL (14); Future  - CBC WITH DIFFERENTIAL WITH PLATELET; Future    2.  Primary osteoarthritis of right hip  No nonsteroidals f Cosentyx Counseling:  I discussed with the patient the risks of Cosentyx including but not limited to worsening of Crohn's disease, immunosuppression, allergic reactions and infections.  The patient understands that monitoring is required including a PPD at baseline and must alert us or the primary physician if symptoms of infection or other concerning signs are noted.

## 2024-07-18 ENCOUNTER — TELEPHONE (OUTPATIENT)
Dept: INFECTIOUS DISEASES | Facility: CLINIC | Age: 54
End: 2024-07-18

## 2024-08-20 ENCOUNTER — LAB (OUTPATIENT)
Dept: LAB | Facility: HOSPITAL | Age: 54
End: 2024-08-20
Payer: COMMERCIAL

## 2024-08-20 ENCOUNTER — OFFICE VISIT (OUTPATIENT)
Dept: INFECTIOUS DISEASES | Facility: CLINIC | Age: 54
End: 2024-08-20
Payer: COMMERCIAL

## 2024-08-20 VITALS
WEIGHT: 287 LBS | RESPIRATION RATE: 20 BRPM | TEMPERATURE: 97.1 F | HEART RATE: 83 BPM | DIASTOLIC BLOOD PRESSURE: 82 MMHG | SYSTOLIC BLOOD PRESSURE: 139 MMHG

## 2024-08-20 DIAGNOSIS — M27.2 OSTEOMYELITIS OF MANDIBLE: Primary | ICD-10-CM

## 2024-08-20 DIAGNOSIS — K08.409 S/P TOOTH EXTRACTION: ICD-10-CM

## 2024-08-20 LAB
ANION GAP SERPL CALCULATED.3IONS-SCNC: 8.1 MMOL/L (ref 5–15)
BASOPHILS # BLD AUTO: 0.02 10*3/MM3 (ref 0–0.2)
BASOPHILS NFR BLD AUTO: 0.4 % (ref 0–1.5)
BUN SERPL-MCNC: 20 MG/DL (ref 6–20)
BUN/CREAT SERPL: 18.7 (ref 7–25)
CALCIUM SPEC-SCNC: 9.7 MG/DL (ref 8.6–10.5)
CHLORIDE SERPL-SCNC: 104 MMOL/L (ref 98–107)
CO2 SERPL-SCNC: 27.9 MMOL/L (ref 22–29)
CREAT SERPL-MCNC: 1.07 MG/DL (ref 0.57–1)
CRP SERPL-MCNC: 0.31 MG/DL (ref 0–0.5)
DEPRECATED RDW RBC AUTO: 42.4 FL (ref 37–54)
EGFRCR SERPLBLD CKD-EPI 2021: 62.6 ML/MIN/1.73
EOSINOPHIL # BLD AUTO: 0.09 10*3/MM3 (ref 0–0.4)
EOSINOPHIL NFR BLD AUTO: 1.7 % (ref 0.3–6.2)
ERYTHROCYTE [DISTWIDTH] IN BLOOD BY AUTOMATED COUNT: 12.6 % (ref 12.3–15.4)
GLUCOSE SERPL-MCNC: 101 MG/DL (ref 65–99)
HCT VFR BLD AUTO: 38.7 % (ref 34–46.6)
HGB BLD-MCNC: 12.8 G/DL (ref 12–15.9)
IMM GRANULOCYTES # BLD AUTO: 0.03 10*3/MM3 (ref 0–0.05)
IMM GRANULOCYTES NFR BLD AUTO: 0.6 % (ref 0–0.5)
LYMPHOCYTES # BLD AUTO: 1.58 10*3/MM3 (ref 0.7–3.1)
LYMPHOCYTES NFR BLD AUTO: 29.8 % (ref 19.6–45.3)
MCH RBC QN AUTO: 30.5 PG (ref 26.6–33)
MCHC RBC AUTO-ENTMCNC: 33.1 G/DL (ref 31.5–35.7)
MCV RBC AUTO: 92.4 FL (ref 79–97)
MONOCYTES # BLD AUTO: 0.29 10*3/MM3 (ref 0.1–0.9)
MONOCYTES NFR BLD AUTO: 5.5 % (ref 5–12)
NEUTROPHILS NFR BLD AUTO: 3.29 10*3/MM3 (ref 1.7–7)
NEUTROPHILS NFR BLD AUTO: 62 % (ref 42.7–76)
NRBC BLD AUTO-RTO: 0 /100 WBC (ref 0–0.2)
PLATELET # BLD AUTO: 211 10*3/MM3 (ref 140–450)
PMV BLD AUTO: 9.6 FL (ref 6–12)
POTASSIUM SERPL-SCNC: 4.8 MMOL/L (ref 3.5–5.2)
RBC # BLD AUTO: 4.19 10*6/MM3 (ref 3.77–5.28)
SODIUM SERPL-SCNC: 140 MMOL/L (ref 136–145)
WBC NRBC COR # BLD AUTO: 5.3 10*3/MM3 (ref 3.4–10.8)

## 2024-08-20 PROCEDURE — 99204 OFFICE O/P NEW MOD 45 MIN: CPT | Performed by: INTERNAL MEDICINE

## 2024-08-20 PROCEDURE — 85025 COMPLETE CBC W/AUTO DIFF WBC: CPT | Performed by: INTERNAL MEDICINE

## 2024-08-20 PROCEDURE — 86140 C-REACTIVE PROTEIN: CPT | Performed by: INTERNAL MEDICINE

## 2024-08-20 PROCEDURE — 80048 BASIC METABOLIC PNL TOTAL CA: CPT | Performed by: INTERNAL MEDICINE

## 2024-08-20 PROCEDURE — 36415 COLL VENOUS BLD VENIPUNCTURE: CPT | Performed by: INTERNAL MEDICINE

## 2024-08-20 RX ORDER — BUPROPION HYDROCHLORIDE 300 MG/1
300 TABLET ORAL
COMMUNITY
Start: 2023-11-13 | End: 2024-11-12

## 2024-08-20 RX ORDER — ALBUTEROL SULFATE 90 UG/1
AEROSOL, METERED RESPIRATORY (INHALATION)
COMMUNITY
Start: 2024-07-12

## 2024-08-20 RX ORDER — ATORVASTATIN CALCIUM 20 MG/1
TABLET, FILM COATED ORAL
COMMUNITY
Start: 2024-05-07

## 2024-08-20 RX ORDER — ACETAMINOPHEN 500 MG
500-1000 TABLET ORAL
COMMUNITY

## 2024-08-20 RX ORDER — CHLORHEXIDINE GLUCONATE ORAL RINSE 1.2 MG/ML
SOLUTION DENTAL
COMMUNITY

## 2024-08-20 RX ORDER — GABAPENTIN 300 MG/1
CAPSULE ORAL
COMMUNITY
Start: 2024-06-07

## 2024-08-20 RX ORDER — BISOPROLOL FUMARATE 10 MG/1
TABLET, FILM COATED ORAL
COMMUNITY
Start: 2023-11-24

## 2024-08-20 NOTE — PROGRESS NOTES
"Referring Provider: Dr Jane    Reason for Consultation: osteomyelitis of mandible    History of present illness:  Val Long is a 53 y.o. who I am asked to evaluate and give opinion for \"osteomyelitis of mandible.\" History is obtained from the patient and review of the old medical records which I summarize/synthesize as follows: In January 2024, she had an abscessed tooth on the upper right side.  This was treated with antibiotics and resolved.  However, around the same time she developed issues on the lower right at the site of a dental implant that had been performed to many years ago. She thinks this was due to some bone loss that then lead to infection. She was treated empirically with clindamycin and levofloxacin and later changed to  azithromycin which she has essentially been on for about 5-6 weeks. She is also using chlorhexidine mouth rinse 3x daily.    She has had two debridements of the area. The first was in mid-early June. She had pathology sent (scanned under media tab) and showed evidence of inflammation. It does not specifically mention osteomyelitis. The latter debridement was about one month ago. She has been told she has necrotic bone. She has had a Panorex but does not know the results. She has never had a CT scan.     She says she finally stopped having \"oozing\" about 2-3 days ago. She says this drainage had a bad taste.  She can see exposed bone and is concerned about that.  She has not had any fever, chills, or sweats.  She sees her oral surgeon later this afternoon at 4:30 PM to discuss next steps.        PMH/PSH:  Morbid obesity   HLD  Arthritis  Fibromyalgia  Anxiety and depression  Asthma  R hip replacement  Laparoscopic abdominal surgery    Social History:  Lives in [place], [place]      Quit smoking 2016  Occ EtOH use  No illicit drug use  Pet dog at home    Antibiotic allergies and intolerances:    Penicillin caused diarrhea and nausea back in " childhood; she is unsure if she tolerates amoxicillin    Medications:    Current Outpatient Medications:     acetaminophen (TYLENOL) 500 MG tablet, Take 1-2 tablets by mouth., Disp: , Rfl:     albuterol sulfate  (90 Base) MCG/ACT inhaler, INHALE 2 PUFFS EVERY 6 HOURS AS NEEDED FOR WHEEZING OR SHORTNESS OF BREATH, Disp: , Rfl:     atorvastatin (LIPITOR) 20 MG tablet, TAKE 1 TABLET(20 MG) BY MOUTH 1 TIME EACH DAY, Disp: , Rfl:     bisoprolol (ZEBeta) 10 MG tablet, TAKE 1 TABLET(10 MG) BY MOUTH 1 TIME EACH DAY, Disp: , Rfl:     buPROPion XL (WELLBUTRIN XL) 300 MG 24 hr tablet, Take 1 tablet by mouth., Disp: , Rfl:     chlorhexidine (PERIDEX) 0.12 % solution, SWISH AND SPIT 5 ML BY MOUTH THREE TIMES DAILY, Disp: , Rfl:     gabapentin (NEURONTIN) 300 MG capsule, TAKE 1 CAPSULE(300 MG) BY MOUTH EVERY NIGHT, Disp: , Rfl:           Objective   Vital Signs   /82   Pulse 83   Temp 97.1 °F (36.2 °C)   Resp 20   Wt 130 kg (287 lb)       Physical Exam:   General: awake, alert, NAD   Eyes: no scleral icterus  ENT: no thrush, site of right mandibular tooth extraction with exposed bone and mild surrounding erythema  Cardiovascular: NR  Respiratory: normal work of breathing on RA  GI: Abdomen is soft, not tender, + bowel sounds in all four quadrants  :  no Miller catheter  Skin: No rashes  Neurological: Alert and oriented x 3  Psychiatric: Normal mood and affect       Labs:     Lab Results   Component Value Date    WBC 4.6 11/13/2023    HGB 13.4 11/13/2023    HCT 40.7 11/13/2023    MCV 90.8 11/13/2023     11/13/2023       Microbiology:  none    Radiology:  None    ASSESSMENT/PLAN:  Chronic osteomyelitis of the mandible  Chronic inflammation mandible  Long-term use of antibiotic  Obesity, BMI not calculated since we do not have a height but weight 287#    The pathology from June 2024 does not specifically mention osteomyelitis, but the history and exam including exposed bone would make the diagnosis in my  "opinion.  However, before embarking on a 6 weeks course of antibiotics, I think she should see her oral surgeon this afternoon to figure out what next steps are planned in order to address her exposed bone.  Additionally, I would like to get a CT scan to evaluate depth of infection to know how much debridement might be needed if surgery is performed.  Also, I would like to get a baseline CBC, BMP and CRP.    I will call her tomorrow with her lab results at which time she can tell me what she discussed with her surgeon and then we can make further decisions regarding long-term antibiotic plan.    Her penicillin \"allergy\" was really an intolerance in childhood so if she has good source control then we could probably use Augmentin x 6 weeks.         "

## 2024-08-21 ENCOUNTER — PATIENT ROUNDING (BHMG ONLY) (OUTPATIENT)
Dept: INFECTIOUS DISEASES | Facility: CLINIC | Age: 54
End: 2024-08-21
Payer: COMMERCIAL

## 2024-08-22 ENCOUNTER — TELEPHONE (OUTPATIENT)
Dept: INFECTIOUS DISEASES | Facility: CLINIC | Age: 54
End: 2024-08-22
Payer: COMMERCIAL

## 2024-08-22 NOTE — TELEPHONE ENCOUNTER
CALLED REFERRING PROVIDERS OFFICE FOR LAB/ CULTURE RESULTS. SPOKE WITH DANILO AT THE OFFICE SHE STATES THEY WILL FAX THEM TO OUR OFFICE IF THEY ARE READY AND IF NOT THEY WEILL BE SENT UPON RECEIPT

## 2024-09-03 ENCOUNTER — HOSPITAL ENCOUNTER (OUTPATIENT)
Dept: CT IMAGING | Facility: HOSPITAL | Age: 54
Discharge: HOME | End: 2024-09-03
Admitting: INTERNAL MEDICINE
Payer: COMMERCIAL

## 2024-09-03 PROCEDURE — 70487 CT MAXILLOFACIAL W/DYE: CPT

## 2024-09-03 PROCEDURE — 25510000001 IOPAMIDOL PER 1 ML: Performed by: INTERNAL MEDICINE

## 2024-09-03 RX ORDER — IOPAMIDOL 755 MG/ML
100 INJECTION, SOLUTION INTRAVASCULAR
Status: COMPLETED | OUTPATIENT
Start: 2024-09-03 | End: 2024-09-03

## 2024-09-03 RX ADMIN — IOPAMIDOL 50 ML: 755 INJECTION, SOLUTION INTRAVENOUS at 16:02

## 2024-09-04 NOTE — PROGRESS NOTES
I Obtained culture results from oral surgery procedure and it shows normal skin/respiratory melanie only.  However, her CT of the mandible did show changes consistent with osteomyelitis.  Therefore I recommend that we treat her with Augmentin 875-125 mg p.o. every 12 hours x 6 weeks with stop date 10/15/2024 at which time she will follow-up with me in the infectious diseases clinic.  Discussed this plan with the patient and she is in agreement.  I will fax a copy of the CT scan report over to her oral surgeon for review.

## 2024-10-15 ENCOUNTER — OFFICE VISIT (OUTPATIENT)
Dept: INFECTIOUS DISEASES | Facility: CLINIC | Age: 54
End: 2024-10-15
Payer: COMMERCIAL

## 2024-10-15 VITALS
DIASTOLIC BLOOD PRESSURE: 73 MMHG | HEART RATE: 67 BPM | RESPIRATION RATE: 20 BRPM | WEIGHT: 284.4 LBS | TEMPERATURE: 97.3 F | SYSTOLIC BLOOD PRESSURE: 113 MMHG

## 2024-10-15 DIAGNOSIS — M27.2 OSTEOMYELITIS OF MANDIBLE: Primary | ICD-10-CM

## 2024-10-15 DIAGNOSIS — Z79.2 LONG TERM (CURRENT) USE OF ANTIBIOTICS: ICD-10-CM

## 2024-10-15 DIAGNOSIS — K08.409 S/P TOOTH EXTRACTION: ICD-10-CM

## 2024-10-15 PROCEDURE — 99214 OFFICE O/P EST MOD 30 MIN: CPT | Performed by: INTERNAL MEDICINE

## 2024-10-15 RX ORDER — SEMAGLUTIDE 0.5 MG/.5ML
0.5 INJECTION, SOLUTION SUBCUTANEOUS
COMMUNITY
Start: 2024-09-24

## 2024-10-15 NOTE — PROGRESS NOTES
ID NOTE    CC: f/u chronic osteomyelitis of mandible    History of present illness:  Val Long is a 53 y.o. who is here for follow-up chronic osteomyelitis of the mandible.  She has now completed a 6 weeks course of Augmentin.  I was able to review the cultures from her oral surgeon's office and they showed normal skin/respiratory melanie only.    She says that her mouth is doing well. The combination of bone removal and antibiotics seems to have worked. She tolerated Augmentin w/o any rashes or diarrhea. She is able to eat on the right side of her mouth. Oral surgery ultimately plans to do a bridge or partial plate.     She will be getting her left knee replaced in Feb 2025.       PMH/PSH:  Morbid obesity   Osteomyelitis of mandible  HLD  Arthritis  Fibromyalgia  Anxiety and depression  Asthma  R hip replacement  Laparoscopic abdominal surgery    Social History:  Lives in [place], [place]      Quit smoking 2016  Occ EtOH use  No illicit drug use  Pet dog at home    Antibiotic allergies and intolerances:    Penicillin caused diarrhea and nausea back in childhood; however she has tolerated Augmentin recently; I will remove penicillin allergy from her chart    Medications:    Current Outpatient Medications:     acetaminophen (TYLENOL) 500 MG tablet, Take 1-2 tablets by mouth., Disp: , Rfl:     albuterol sulfate  (90 Base) MCG/ACT inhaler, INHALE 2 PUFFS EVERY 6 HOURS AS NEEDED FOR WHEEZING OR SHORTNESS OF BREATH, Disp: , Rfl:     amoxicillin-clavulanate (AUGMENTIN) 875-125 MG per tablet, Take 1 tablet by mouth Every 12 (Twelve) Hours for 42 days., Disp: 84 tablet, Rfl: 0    atorvastatin (LIPITOR) 20 MG tablet, TAKE 1 TABLET(20 MG) BY MOUTH 1 TIME EACH DAY, Disp: , Rfl:     bisoprolol (ZEBeta) 10 MG tablet, TAKE 1 TABLET(10 MG) BY MOUTH 1 TIME EACH DAY, Disp: , Rfl:     buPROPion XL (WELLBUTRIN XL) 300 MG 24 hr tablet, Take 1 tablet by mouth., Disp: , Rfl:     chlorhexidine (PERIDEX)  "0.12 % solution, SWISH AND SPIT 5 ML BY MOUTH THREE TIMES DAILY, Disp: , Rfl:     gabapentin (NEURONTIN) 300 MG capsule, TAKE 1 CAPSULE(300 MG) BY MOUTH EVERY NIGHT, Disp: , Rfl:       Objective   Vital Signs   /73   Pulse 67   Temp 97.3 °F (36.3 °C)   Resp 20   Wt 129 kg (284 lb 6.4 oz)     Physical Exam:   General: awake, alert, NAD   Eyes: no scleral icterus  ENT: no thrush, site of tooth extraction looks great w/o any erythema or drainage  Cardiovascular: NR  Respiratory: normal work of breathing on ambient air  GI: Abdomen is soft, not tender  :  no Miller catheter  Neurological: Alert and oriented x 3  Psychiatric: Normal mood and affect       Labs:   CBC, BMP, and CRP reviewed today  Lab Results   Component Value Date    WBC 5.30 08/20/2024    HGB 12.8 08/20/2024    HCT 38.7 08/20/2024    MCV 92.4 08/20/2024     08/20/2024     Lab Results   Component Value Date    GLUCOSE 101 (H) 08/20/2024    CALCIUM 9.7 08/20/2024     08/20/2024    K 4.8 08/20/2024    CO2 27.9 08/20/2024     08/20/2024    BUN 20 08/20/2024    CREATININE 1.07 (H) 08/20/2024    EGFR 62.6 08/20/2024    BCR 18.7 08/20/2024    ANIONGAP 8.1 08/20/2024     Lab Results   Component Value Date    CRP 0.31 08/20/2024       Microbiology:  OR culture with normal skin/mouth melanie    Radiology:  9/3 CT facial bones: \"This patient has an abnormal appearance to the right anterior body of  the mandible. It appears there has been previous resection of right  mandibular teeth 29 and 30 and possibly 31 and there is periapical  lucency adjacent to the root of the right mandibular premolar tooth #28,  likely a thin chronic periapical abscess and there are postoperative  changes to the anterior right mandibular body involving the mid and  superior aspect of the right anterior body of the mandible where there  was resection of teeth #29 and 30 and possibly tooth #31 and there is a  lucent cavity within the right anterior body of the " "mandible measuring  18 x 9 x 9 mm in anterior posterior and mediolateral and craniocaudal  dimensions some of which may be a postoperative resection cavity  although some of this could be the sequela of chronic osteomyelitis.  Furthermore adjacent of this lucent areas sclerosis in the marrow fat of  the mandible along the anterior and posterior margins of this lucent  cavity that is felt to be reactive osseous changes likely from chronic  osteomyelitis and there is a defect in the medial cortex of the mandible  where there is a 7 x 2 mm piece of medial cortical bone that is  surrounded by lucency and could be a bony sequestration from chronic  osteomyelitis. The lucent cavity in the right anterior body of the  mandible connects with the periapical lucency adjacent to the root of  the right mandibular premolar tooth #28. There is some perimandibular  soft tissue swelling but no rim-enhancing perimandibular abscess is  seen. The remainder of the facial CT is within normal limits.  Unfortunately, I have no preoperative or immediate postoperative imaging  of the right mandible to compare this study to and of course the  findings need to be correlated to what represents postsurgical changes  in the right anterior body of the mandible and what represents the  sequela of chronic osteomyelitis. A baseline MRI of the face with and  without contrast could also be obtained to further characterize the  abnormality. Certainly a follow-up CT scan of the face with and without  contrast could be obtained to reevaluate if clinically indicated.\"    ASSESSMENT/PLAN:  Chronic osteomyelitis of the mandible  Chronic inflammation mandible  Long-term use of antibiotic  Obesity, BMI not calculated since we do not have a height but weight 284#    She has now completed a 6 weeks course of Augmentin following surgical debridement.  I recommend that she discontinue her antibiotic at this time.    I have removed penicillin allergy from her " allergy list.  It was really an intolerance to many years ago but not a true allergy.  Additionally, she tolerated Augmentin without any issues.    RTC PRN. Will fax a copy of this note to her surgeon.

## (undated) NOTE — LETTER
ASTHMA ACTION PLAN for Nani Felder     : 10/30/1970     Date: 5/15/2018  Provider:  Bárbara Norton PA-C  Phone for doctor or clinic: [facility], [address] [place] [address], [address] [place] [address] [phone]

## (undated) NOTE — Clinical Note
ASTHMA ACTION PLAN for Ferdinand Huang     : 10/30/1970     Date: 5/15/2017  Provider:  Guido Wooten PA-C  Phone for doctor or clinic: [facility], [address] [place] [address], [address] [place] [address] [phone]

## (undated) NOTE — MR AVS SNAPSHOT
[facility] [place]  Bashir Blackwood [facility]  [phone]               Thank you for choosing us for your health care visit with Cornelio Aponte PA-C.   We are glad to serve you and happy to provide you with [facility] This list is accurate as of: 5/12/17 11:59 PM.  Always use your most recent med list.                Albuterol Sulfate  (90 Base) MCG/ACT Aers   Inhale 2 puffs into the lungs every 6 (six) hours as needed.            ALEVE OR   Take 3 tablets by mout office, you can view your past visit information in [employer] by going to Visits < Visit Summaries. [facility] questions? Call [phone] for help. [facility] is NOT to be used for urgent needs. For medical emergencies, dial 911.         Educational Inform Enjoy your food, but eat less. Fully enjoy your food when eating. Don’t eat while distracted and slow down. Avoid over sized portions. Don’t eat while when you’re bored.      EAT THESE FOODS MORE OFTEN: EAT THESE FOODS LESS OFTEN:   Make half your pl

## (undated) NOTE — LETTER
12/13/18        [address]      Dear Rosa Cárdenas,    [address] [facility] records indicate that you have outstanding lab work and or testing that was ordered for you and has not yet been completed:     cmp